# Patient Record
Sex: FEMALE | Race: WHITE | NOT HISPANIC OR LATINO | URBAN - METROPOLITAN AREA
[De-identification: names, ages, dates, MRNs, and addresses within clinical notes are randomized per-mention and may not be internally consistent; named-entity substitution may affect disease eponyms.]

---

## 2017-07-28 ENCOUNTER — OUTPATIENT (OUTPATIENT)
Dept: OUTPATIENT SERVICES | Facility: HOSPITAL | Age: 37
LOS: 1 days | End: 2017-07-28
Payer: COMMERCIAL

## 2017-07-28 DIAGNOSIS — Z01.818 ENCOUNTER FOR OTHER PREPROCEDURAL EXAMINATION: ICD-10-CM

## 2017-07-28 LAB
BLD GP AB SCN SERPL QL: NEGATIVE — SIGNIFICANT CHANGE UP
BLD GP AB SCN SERPL QL: NEGATIVE — SIGNIFICANT CHANGE UP
HCT VFR BLD CALC: 36 % — SIGNIFICANT CHANGE UP (ref 34.5–45)
HGB BLD-MCNC: 11.7 G/DL — SIGNIFICANT CHANGE UP (ref 11.5–15.5)
MCHC RBC-ENTMCNC: 31 PG — SIGNIFICANT CHANGE UP (ref 27–34)
MCHC RBC-ENTMCNC: 32.5 G/DL — SIGNIFICANT CHANGE UP (ref 32–36)
MCV RBC AUTO: 95.5 FL — SIGNIFICANT CHANGE UP (ref 80–100)
PLATELET # BLD AUTO: 217 K/UL — SIGNIFICANT CHANGE UP (ref 150–400)
RBC # BLD: 3.77 M/UL — LOW (ref 3.8–5.2)
RBC # FLD: 13 % — SIGNIFICANT CHANGE UP (ref 10.3–16.9)
RH IG SCN BLD-IMP: POSITIVE — SIGNIFICANT CHANGE UP
RH IG SCN BLD-IMP: POSITIVE — SIGNIFICANT CHANGE UP
WBC # BLD: 7.9 K/UL — SIGNIFICANT CHANGE UP (ref 3.8–10.5)
WBC # FLD AUTO: 7.9 K/UL — SIGNIFICANT CHANGE UP (ref 3.8–10.5)

## 2017-07-28 PROCEDURE — 86780 TREPONEMA PALLIDUM: CPT

## 2017-07-28 PROCEDURE — 86900 BLOOD TYPING SEROLOGIC ABO: CPT

## 2017-07-28 PROCEDURE — 86850 RBC ANTIBODY SCREEN: CPT

## 2017-07-28 PROCEDURE — 86901 BLOOD TYPING SEROLOGIC RH(D): CPT

## 2017-07-28 PROCEDURE — 85027 COMPLETE CBC AUTOMATED: CPT

## 2017-07-30 LAB — T PALLIDUM AB TITR SER: NEGATIVE — SIGNIFICANT CHANGE UP

## 2017-07-31 ENCOUNTER — INPATIENT (INPATIENT)
Facility: HOSPITAL | Age: 37
LOS: 1 days | Discharge: HOME | End: 2017-08-02
Attending: OBSTETRICS & GYNECOLOGY | Admitting: OBSTETRICS & GYNECOLOGY
Payer: COMMERCIAL

## 2017-07-31 VITALS — WEIGHT: 155.43 LBS | HEIGHT: 67 IN

## 2017-07-31 LAB
HBV SURFACE AG SER-ACNC: SIGNIFICANT CHANGE UP
HIV 1+2 AB+HIV1 P24 AG SERPL QL IA: SIGNIFICANT CHANGE UP

## 2017-07-31 RX ORDER — CEFAZOLIN SODIUM 1 G
2000 VIAL (EA) INJECTION ONCE
Qty: 0 | Refills: 0 | Status: COMPLETED | OUTPATIENT
Start: 2017-07-31 | End: 2017-07-31

## 2017-07-31 RX ORDER — SODIUM CHLORIDE 9 MG/ML
1000 INJECTION, SOLUTION INTRAVENOUS
Qty: 0 | Refills: 0 | Status: DISCONTINUED | OUTPATIENT
Start: 2017-07-31 | End: 2017-07-31

## 2017-07-31 RX ORDER — OXYTOCIN 10 UNIT/ML
333.33 VIAL (ML) INJECTION
Qty: 20 | Refills: 0 | Status: COMPLETED | OUTPATIENT
Start: 2017-07-31 | End: 2017-07-31

## 2017-07-31 RX ORDER — SODIUM CHLORIDE 9 MG/ML
1000 INJECTION, SOLUTION INTRAVENOUS
Qty: 0 | Refills: 0 | Status: DISCONTINUED | OUTPATIENT
Start: 2017-08-01 | End: 2017-08-02

## 2017-07-31 RX ORDER — SODIUM CHLORIDE 9 MG/ML
1000 INJECTION, SOLUTION INTRAVENOUS ONCE
Qty: 0 | Refills: 0 | Status: COMPLETED | OUTPATIENT
Start: 2017-07-31 | End: 2017-07-31

## 2017-07-31 RX ORDER — SIMETHICONE 80 MG/1
80 TABLET, CHEWABLE ORAL EVERY 4 HOURS
Qty: 0 | Refills: 0 | Status: DISCONTINUED | OUTPATIENT
Start: 2017-07-31 | End: 2017-08-02

## 2017-07-31 RX ORDER — DOCUSATE SODIUM 100 MG
100 CAPSULE ORAL
Qty: 0 | Refills: 0 | Status: DISCONTINUED | OUTPATIENT
Start: 2017-07-31 | End: 2017-08-02

## 2017-07-31 RX ORDER — ONDANSETRON 8 MG/1
4 TABLET, FILM COATED ORAL EVERY 6 HOURS
Qty: 0 | Refills: 0 | Status: DISCONTINUED | OUTPATIENT
Start: 2017-07-31 | End: 2017-08-02

## 2017-07-31 RX ORDER — ACETAMINOPHEN 500 MG
650 TABLET ORAL EVERY 6 HOURS
Qty: 0 | Refills: 0 | Status: DISCONTINUED | OUTPATIENT
Start: 2017-07-31 | End: 2017-08-02

## 2017-07-31 RX ORDER — LANOLIN
1 OINTMENT (GRAM) TOPICAL
Qty: 0 | Refills: 0 | Status: DISCONTINUED | OUTPATIENT
Start: 2017-07-31 | End: 2017-08-02

## 2017-07-31 RX ORDER — NALOXONE HYDROCHLORIDE 4 MG/.1ML
0.1 SPRAY NASAL
Qty: 0 | Refills: 0 | Status: DISCONTINUED | OUTPATIENT
Start: 2017-07-31 | End: 2017-08-02

## 2017-07-31 RX ORDER — IBUPROFEN 200 MG
600 TABLET ORAL EVERY 6 HOURS
Qty: 0 | Refills: 0 | Status: DISCONTINUED | OUTPATIENT
Start: 2017-07-31 | End: 2017-08-02

## 2017-07-31 RX ORDER — OXYCODONE AND ACETAMINOPHEN 5; 325 MG/1; MG/1
1 TABLET ORAL
Qty: 0 | Refills: 0 | Status: DISCONTINUED | OUTPATIENT
Start: 2017-07-31 | End: 2017-08-02

## 2017-07-31 RX ORDER — METOCLOPRAMIDE HCL 10 MG
10 TABLET ORAL ONCE
Qty: 0 | Refills: 0 | Status: DISCONTINUED | OUTPATIENT
Start: 2017-07-31 | End: 2017-07-31

## 2017-07-31 RX ORDER — OXYCODONE AND ACETAMINOPHEN 5; 325 MG/1; MG/1
2 TABLET ORAL EVERY 6 HOURS
Qty: 0 | Refills: 0 | Status: DISCONTINUED | OUTPATIENT
Start: 2017-07-31 | End: 2017-08-02

## 2017-07-31 RX ORDER — TETANUS TOXOID, REDUCED DIPHTHERIA TOXOID AND ACELLULAR PERTUSSIS VACCINE, ADSORBED 5; 2.5; 8; 8; 2.5 [IU]/.5ML; [IU]/.5ML; UG/.5ML; UG/.5ML; UG/.5ML
0.5 SUSPENSION INTRAMUSCULAR ONCE
Qty: 0 | Refills: 0 | Status: COMPLETED | OUTPATIENT
Start: 2017-07-31

## 2017-07-31 RX ORDER — CITRIC ACID/SODIUM CITRATE 300-500 MG
30 SOLUTION, ORAL ORAL ONCE
Qty: 0 | Refills: 0 | Status: COMPLETED | OUTPATIENT
Start: 2017-07-31 | End: 2017-07-31

## 2017-07-31 RX ORDER — METOCLOPRAMIDE HCL 10 MG
10 TABLET ORAL ONCE
Qty: 0 | Refills: 0 | Status: COMPLETED | OUTPATIENT
Start: 2017-07-31 | End: 2017-07-31

## 2017-07-31 RX ORDER — DIPHENHYDRAMINE HCL 50 MG
25 CAPSULE ORAL EVERY 6 HOURS
Qty: 0 | Refills: 0 | Status: DISCONTINUED | OUTPATIENT
Start: 2017-07-31 | End: 2017-08-02

## 2017-07-31 RX ORDER — FERROUS SULFATE 325(65) MG
325 TABLET ORAL DAILY
Qty: 0 | Refills: 0 | Status: DISCONTINUED | OUTPATIENT
Start: 2017-07-31 | End: 2017-08-02

## 2017-07-31 RX ORDER — GLYCERIN ADULT
1 SUPPOSITORY, RECTAL RECTAL AT BEDTIME
Qty: 0 | Refills: 0 | Status: DISCONTINUED | OUTPATIENT
Start: 2017-07-31 | End: 2017-08-02

## 2017-07-31 RX ORDER — OXYTOCIN 10 UNIT/ML
41.67 VIAL (ML) INJECTION
Qty: 20 | Refills: 0 | Status: DISCONTINUED | OUTPATIENT
Start: 2017-07-31 | End: 2017-08-02

## 2017-07-31 RX ADMIN — Medication 1000 MILLIUNIT(S)/MIN: at 10:23

## 2017-07-31 RX ADMIN — ONDANSETRON 4 MILLIGRAM(S): 8 TABLET, FILM COATED ORAL at 12:15

## 2017-07-31 RX ADMIN — Medication 100 MILLIGRAM(S): at 08:25

## 2017-07-31 RX ADMIN — Medication 125 MILLIUNIT(S)/MIN: at 10:23

## 2017-07-31 RX ADMIN — Medication 600 MILLIGRAM(S): at 23:00

## 2017-07-31 RX ADMIN — Medication 600 MILLIGRAM(S): at 22:22

## 2017-07-31 RX ADMIN — Medication 30 MILLILITER(S): at 08:24

## 2017-07-31 RX ADMIN — SODIUM CHLORIDE 125 MILLILITER(S): 9 INJECTION, SOLUTION INTRAVENOUS at 08:24

## 2017-07-31 RX ADMIN — Medication 10 MILLIGRAM(S): at 16:31

## 2017-07-31 RX ADMIN — Medication 600 MILLIGRAM(S): at 16:47

## 2017-07-31 RX ADMIN — Medication 600 MILLIGRAM(S): at 17:30

## 2017-07-31 RX ADMIN — SODIUM CHLORIDE 2000 MILLILITER(S): 9 INJECTION, SOLUTION INTRAVENOUS at 07:00

## 2017-07-31 NOTE — DISCHARGE NOTE OB - CARE PROVIDER_API CALL
Chip Reina), Obstetrics and Gynecology  [address] [place] [address]  Phone: [phone]  Fax: [phone]

## 2017-07-31 NOTE — PROGRESS NOTE ADULT - SUBJECTIVE AND OBJECTIVE BOX
Section Operative Note      Pre-Op Diagnosis:REPEEAT  CESAEREAN SECTION  AT  TERM      Post-Op Diagnosis:SAME       Time Out: 	[x ] Performed		[ ]Not Performed:  Procedure: 	 Section: 	[ X] Repeat 	#__2_____	[ ] Primary         [ ]Emergency	        [ ]Other____________:  Uterine incision:         [ x]Low Transverse C/S	[ ]Low Vertical C/S           [ ]Classical C/S							  Additional Procedures: 	[ ] Bilateral Tubal Ligation.  Type:______________  Other:	[ ]Lysis of Adhesions   	[ ]C-Hysterectomy          [ ]Other__________________:  Surgeon:  Dr. Reina                                                   .	Assist:   __DR JENNIFER PRIETO , DR NOLAND _________________.                                                                             .   Anesthesia: _______DR JONES_______________________	Type: [ ]Epidural  [X ] Spinal   [ ] General	[ ]Other:  IV Fluids:[id number]  2G  ANCEF 20  U PITOCIN 		Urine Output:	500CC CLEAR	Wilkerson:  [ ] Yes [ ]No [ ] Other:  EBL:    900		  Delivery findings:    [X ] Live[ ]Non-Viable: [ ]MALE   [X ]FEMALE, Infant. APGAR       8       AND     9            .  [X ] Peds at delivery.  [ ]MULTIPLE GESTATION -NOTES:      POSITION:                        PRESENTATION                                .  DELIVERED BY:  	[X ]HEAD 		[ ]BREECH 	[ ]OTHER:  		[X ]MANUAL 		[ ]VACUUM	[ ] OTHER:	  PLACENTA: 	[x ]Removed	[ x]Uterus explored		[x ]Empty		[ ]Other 		  		UTERUS:  	[ x]Normal		[ ]Fibroids		[ ] Other:  ADNEXA: 	[ x]Right Normal	[ ]Other:  	[x ]Left Normal	[ ]Other :  PELVIS:	[ x]Normal		[ ]Adhesions [ ]Mild  [ ]Moderate [ ]Severe  Procedure:  	Patient in supine position.    Skin Incision	[x ]Pfannenstiel	[ ]Other:			[ ]Old scar  removal.  		Fascial Incision	[ x]Transverse 	[ ]Other:		  Peritoneal incision	[x ]Performed	[ x]Vertical  [ ]Other:		[ ]Lysis of Adhesions  		Bladder Flap	[ ]Created	[ ]Not Created  		Uterine Incision	[x ]Low transverse	[ ]Low Vertical	[ ]Classical  [ ]Other:   	Uterine Repair	[ x]_#_1_______Layers-Using__1 chromic______________ sutures 	[x ] hemostasis  excellent.  				[ ]Other:                       .                      Abdominal Cavity	[x ]Cleared of clots and debris  Rectus  Muscles  	Reapproximated [x ]Yes Using__1 chromic______________ sutures. [ ]Not Re- Approximated.  Fascial Reapproximation 	[ x]UsingUsing___1 Vicril_____________ sutures [ ]Other:                                 .	  Subcutaneous Tissue 	[x ]Irrigated  and hemostasis assured:[x ]Reapproximated Using____2-0____________ sutures.  Skin Reapproximation:	[x ]Subcuticular Using________________ sutures [x ] Insorb Staples   [ ]Skin Staples [ ]Other:  Dressing applied  and Patient to RR in  [x ] Stable [ ] Other ;                         condition.	  End of Operation;	[x ] Sponge/lap/needle count correct.  [ ] Not correct.  [ ]Not Done [ ]X-ray=Clear  Pathology:	[ x]Placenta.   	[ ]Fallopian Tube Portions [ ]Right [ ]Left.	[ ]Other:                                           	Complications/Attending’s Notes:	[ ] None.  	[ ] Other:                                                                        [ ]CONTINUATION OF NOTES NEXT PAGE:  Section Operative Note      Pre-Op Diagnosis:REPEEAT  CESAEREAN SECTION  AT  TERM      Post-Op Diagnosis:SAME       Time Out: 	[x ] Performed		[ ]Not Performed:  Procedure: 	 Section: 	[ X] Repeat 	#__2_____	[ ] Primary         [ ]Emergency	        [ ]Other____________:  Uterine incision:         [ x]Low Transverse C/S	[ ]Low Vertical C/S           [ ]Classical C/S							  Additional Procedures: 	[ ] Bilateral Tubal Ligation.  Type:______________  Other:	[ ]Lysis of Adhesions   	[ ]C-Hysterectomy          [ ]Other__________________:  Surgeon:  Dr. Reina                                                   .	Assist:   __DR JENNIFER PRIETO , DR NOLAND _________________.                                                                             .   Anesthesia: _______DR JONES_______________________	Type: [ ]Epidural  [X ] Spinal   [ ] General	[ ]Other:  IV Fluids:[id number]  2G  ANCEF 20  U PITOCIN 		Urine Output:	500CC CLEAR	Wilkerson:  [ ] Yes [ ]No [ ] Other:  EBL:    900		  Delivery findings:    [X ] Live[ ]Non-Viable: [ ]MALE   [X ]FEMALE, Infant. APGAR       8       AND     9            .  [X ] Peds at delivery.  [ ]MULTIPLE GESTATION -NOTES:      POSITION:                        PRESENTATION                                .  DELIVERED BY:  	[X ]HEAD 		[ ]BREECH 	[ ]OTHER:  		[X ]MANUAL 		[ ]VACUUM	[ ] OTHER:	  PLACENTA: 	[x ]Removed	[ x]Uterus explored		[x ]Empty		[ x]Other  Nuchal  cord  x1 		  		UTERUS:  	[ x]Normal		[ ]Fibroids		[ ] Other:  ADNEXA: 	[ x]Right Normal	[ ]Other:  	[x ]Left Normal	[ ]Other :  PELVIS:	[ x]Normal		[ ]Adhesions [ ]Mild  [ ]Moderate [ ]Severe  Procedure:  	Patient in supine position.    Skin Incision	[x ]Pfannenstiel	[ ]Other:			[ ]Old scar  removal.  		Fascial Incision	[ x]Transverse 	[ ]Other:		  Peritoneal incision	[x ]Performed	[ x]Vertical  [ ]Other:		[ ]Lysis of Adhesions  		Bladder Flap	[ ]Created	[ ]Not Created  		Uterine Incision	[x ]Low transverse	[ ]Low Vertical	[ ]Classical  [ ]Other:   	Uterine Repair	[ x]_#_1_______Layers-Using__1 chromic______________ sutures 	[x ] hemostasis  excellent.  				[ ]Other:                       .                      Abdominal Cavity	[x ]Cleared of clots and debris  Rectus  Muscles  	Reapproximated [x ]Yes Using__1 chromic______________ sutures. [ ]Not Re- Approximated.  Fascial Reapproximation 	[ x]UsingUsing___1 Vicril_____________ sutures [ ]Other:                                 .	  Subcutaneous Tissue 	[x ]Irrigated  and hemostasis assured:[x ]Reapproximated Using____2-0____________ sutures.  Skin Reapproximation:	[x ]Subcuticular Using________________ sutures [x ] Insorb Staples   [ ]Skin Staples [ ]Other:  Dressing applied  and Patient to RR in  [x ] Stable [ ] Other ;                         condition.	  End of Operation;	[x ] Sponge/lap/needle count correct.  [ ] Not correct.  [ ]Not Done [ ]X-ray=Clear  Pathology:	[ x]Placenta.   	[ ]Fallopian Tube Portions [ ]Right [ ]Left.	[ ]Other:                                           	Complications/Attending’s Notes:	[ ] None.  	[ ] Other:                                                                        [ ]CONTINUATION OF NOTES NEXT PAGE:

## 2017-07-31 NOTE — DISCHARGE NOTE OB - PATIENT PORTAL LINK FT
“You can access the [employer] Patient Portal, offered by [facility], by registering with the following website: [email or web address]”

## 2017-07-31 NOTE — DISCHARGE NOTE OB - PLAN OF CARE
HOME Nothin per vagina  no heavy  lifting,  no  sex  no  tub  bath  no swimming Nothing per vagina  no heavy  lifting,  no  sex  no  tub  bath  no swimming

## 2017-07-31 NOTE — DISCHARGE NOTE OB - MATERIALS PROVIDED
[facility] [place] Screening Program/Breastfeeding Guide and Packet/Back To Sleep Handout/Shaken Baby Prevention Handout/[facility] [place] Screen Program/[place]  Immunization Record/Guide to Postpartum Care

## 2017-07-31 NOTE — DISCHARGE NOTE OB - CARE PLAN
Principal Discharge DX:	Normal postpartum course  Goal:	HOME  Instructions for follow-up, activity and diet:	Nothin per vagina  no heavy  lifting,  no  sex  no  tub  bath  no swimming Principal Discharge DX:	Normal postpartum course  Goal:	HOME  Instructions for follow-up, activity and diet:	Nothing per vagina  no heavy  lifting,  no  sex  no  tub  bath  no swimming

## 2017-07-31 NOTE — DISCHARGE NOTE OB - HOSPITAL COURSE
Repeat  low  transeverse    sectin  livve  female  infant apgar  8  an9    one  nuchal  cord .  ebl m 900cc  ped  at  [facility]

## 2017-08-01 RX ORDER — ACETAMINOPHEN 500 MG
650 TABLET ORAL EVERY 6 HOURS
Qty: 0 | Refills: 0 | Status: DISCONTINUED | OUTPATIENT
Start: 2017-08-01 | End: 2017-08-02

## 2017-08-01 RX ORDER — ZINC OXIDE 200 MG/G
1 OINTMENT TOPICAL DAILY
Qty: 0 | Refills: 0 | Status: DISCONTINUED | OUTPATIENT
Start: 2017-08-01 | End: 2017-08-02

## 2017-08-01 RX ORDER — TETANUS TOXOID, REDUCED DIPHTHERIA TOXOID AND ACELLULAR PERTUSSIS VACCINE, ADSORBED 5; 2.5; 8; 8; 2.5 [IU]/.5ML; [IU]/.5ML; UG/.5ML; UG/.5ML; UG/.5ML
0.5 SUSPENSION INTRAMUSCULAR ONCE
Qty: 0 | Refills: 0 | Status: COMPLETED | OUTPATIENT
Start: 2017-08-01 | End: 2017-08-01

## 2017-08-01 RX ADMIN — Medication 650 MILLIGRAM(S): at 09:13

## 2017-08-01 RX ADMIN — Medication 600 MILLIGRAM(S): at 18:30

## 2017-08-01 RX ADMIN — Medication 600 MILLIGRAM(S): at 11:30

## 2017-08-01 RX ADMIN — Medication 1 TABLET(S): at 15:59

## 2017-08-01 RX ADMIN — Medication 100 MILLIGRAM(S): at 15:59

## 2017-08-01 RX ADMIN — Medication 650 MILLIGRAM(S): at 15:59

## 2017-08-01 RX ADMIN — Medication 600 MILLIGRAM(S): at 04:15

## 2017-08-01 RX ADMIN — Medication 600 MILLIGRAM(S): at 22:20

## 2017-08-01 RX ADMIN — Medication 325 MILLIGRAM(S): at 15:59

## 2017-08-01 RX ADMIN — Medication 600 MILLIGRAM(S): at 04:45

## 2017-08-01 RX ADMIN — Medication 600 MILLIGRAM(S): at 23:22

## 2017-08-01 RX ADMIN — Medication 600 MILLIGRAM(S): at 17:51

## 2017-08-01 RX ADMIN — Medication 1 APPLICATION(S): at 15:59

## 2017-08-01 RX ADMIN — TETANUS TOXOID, REDUCED DIPHTHERIA TOXOID AND ACELLULAR PERTUSSIS VACCINE, ADSORBED 0.5 MILLILITER(S): 5; 2.5; 8; 8; 2.5 SUSPENSION INTRAMUSCULAR at 17:53

## 2017-08-01 RX ADMIN — Medication 600 MILLIGRAM(S): at 10:48

## 2017-08-01 RX ADMIN — Medication 650 MILLIGRAM(S): at 16:35

## 2017-08-01 RX ADMIN — ZINC OXIDE 1 APPLICATION(S): 200 OINTMENT TOPICAL at 15:59

## 2017-08-01 NOTE — LACTATION INITIAL EVALUATION - INTERVENTION OUTCOME
demonstrates understanding of teaching/good return demonstration/needs met/discharge criteria met/verbalizes understanding

## 2017-08-01 NOTE — PROGRESS NOTE ADULT - ASSESSMENT
A/P   37y  s/p c/s, POD #1, stable  -  Pain: PO motrin, tylenol, removed tape/bandage today, had minor tape burn, ordered zinc oxide 20% ointment for site, continue to monitor   -  GI: tolerating regular diet, has yet to pass gas, continue to monitor  -  : s/p turnre, excellent urine output  -  DVT prophylaxis: ambulation, SCDs,   -  Dispo: POD 3 or 4

## 2017-08-01 NOTE — PROGRESS NOTE ADULT - SUBJECTIVE AND OBJECTIVE BOX
Patient evaluated at bedside. She has been ambulating without assistance, voiding spontaneouslytolerating regular diet and is breastfeeding.  She reports pain is well controlled with Motrin and Tylenol.  She denies headache, dizziness, chest pain, palpitations, shortness of breathe, nausea, vomiting or heavy vaginal bleeding. Has noticed a decrease in vaginal bleeding since yesterday. Has not passed yet gas today. Reports lower abdominal pressure and mild pain with movement.       Physical Exam:  Vital Signs Last 24 Hrs  T(C): 37.1 (01 Aug 2017 10:01), Max: 37.1 (31 Jul 2017 18:10)  T(F): 98.7 (01 Aug 2017 10:01), Max: 98.7 (31 Jul 2017 18:10)  HR: 77 (01 Aug 2017 10:01) (52 - 77)  BP: 93/57 (01 Aug 2017 10:01) (92/51 - 98/57)  BP(mean): --  RR: 18 (01 Aug 2017 10:01) (16 - 18)  SpO2: 95% (01 Aug 2017 10:01) (95% - 98%)    Constitutional: Alert & Oriented x3, No acute distress, cooperative   Gastrointestinal: soft, mildy tender, positive bowel sounds, no rebound or guarding; uterus firm at midline,  2 fb below umbilicus  Incision: steristrips in place; area clean, dry and intact; no erythema or induration   : s/p turner, lochia WNL   Extremities: SCDs in place, no calf tenderness or swelling

## 2017-08-01 NOTE — LACTATION INITIAL EVALUATION - INFANT FEEDING PLAN COMMENT, OB PROFILE
Offer as much S2S time as possible with the baby. FOD of baby making sure baby feeds 8-12 times per 24 hours. Monitor output and keep a log. F/U with Ped s/p D/C as instructed.

## 2017-08-01 NOTE — PROGRESS NOTE ADULT - SUBJECTIVE AND OBJECTIVE BOX
OB/GYN ATTENDING POSTOP ROUNDS                                    Subjective:  37yFemale  I WANT  TO GO  HOME  TOMORROW   Complaints: [x ]None	[ ]Other:      Objective:  		Vital Signs:  T(C): 36.8 (17 @ 14:00), Max: 37.1 (17 @ 10:01)  HR: 66 (17 @ 14:00) (60 - 77)  BP: 93/57 (17 @ 14:00) (93/57 - 97/62)  RR: 18 (17 @ 14:00) (18 - 18)  SpO2: 98% (17 @ 14:00) (95% - 98%)  Wt(kg): --     @ :  -   @ 07:00  --------------------------------------------------------  IN: 1850 mL / OUT: 2650 mL / NET: -800 mL     @ 07:01  -   @ 23:27  --------------------------------------------------------  IN: 0 mL / OUT: 700 mL / NET: -700 mL        		General:      NAD 	[x ] Yes 	[ ]No,	 A&O X3	[ x] Yes  [ ] No			  		Abdomen:   Palpation  	[x ]Normal	[ ]Other:	  	   Incision:              [x  ]Intact	   [ x] Clean	[x ] Dry    [   ]other:  BS		[ x]Normal 	[ ]Other:  			  LE:  	Calf tenderness    	[ x]None		[x ]No  Sign of DVT	[ ]Other:  		Lochia:	 		[ x]Normal,	[ ]Other:  		Labs:		       Assessment:  			[x ] Post-op  Section  			Day #____1____  				[ ] Other:     	Plan:	1.  Supportive Care		[ ]Other:                                           2. Pain:		[ x] Well Controlled 	[ ] Other:	           	     	3. Misc.		[ x]Continue current care                                            4. Discharge home on HOME 17    PER  PT REQUEST  if stable	[ ] Other:  		  Notes:			[x ] None			[ ] Other:      													Chip Reina MD [phone]

## 2017-08-01 NOTE — LACTATION INITIAL EVALUATION - NS LACT CON REASON FOR REQ
This is mom's second baby and she reports bf'ing her first child successfully for 6 months and then pumped and supplemented until 9 months of age. Found this baby latched onto her R breast in cross cradle position in a deep effective latch with rhythmic sucks and appropriate pauses. Mom c/o slightly sore nipples, but denies skin breakdown. She reports her L nipple "sometimes comes out of the baby's mouth looking creased". Discussed with mom that can be indicative of a slightly shallow latch. Encouraged mom to breastfeed baby S2S to help facilitate a deeper latch. Baby is nursing now swaddled in a blanket. Also encouraged mom to wait to latch baby until baby gives a deep wide mouth opening like a yawn, so the baby takes in more maternal breast tissue from the first latch. Baby born 7/[birth year]@ 0901, R C/S. x6 voids/ x6 poops since birth (breast only, WNL). Wt loss=3.18%. Encouraged lots of S2S and FOD of baby with at least 8-12 feeds per 24 hours. Mom reports she feels her breasts feel heavier today and are filling. Parents report seeing brown/green stool (transitional) at baby's last diaper change. Questions answered, parents reassured./multiparous mom/c/o sore, painful nipples

## 2017-08-02 VITALS
DIASTOLIC BLOOD PRESSURE: 60 MMHG | RESPIRATION RATE: 16 BRPM | TEMPERATURE: 98 F | HEART RATE: 66 BPM | SYSTOLIC BLOOD PRESSURE: 98 MMHG | OXYGEN SATURATION: 97 %

## 2017-08-02 LAB
HCT VFR BLD CALC: 31.3 % — LOW (ref 34.5–45)
HGB BLD-MCNC: 10.3 G/DL — LOW (ref 11.5–15.5)
MCHC RBC-ENTMCNC: 31.7 PG — SIGNIFICANT CHANGE UP (ref 27–34)
MCHC RBC-ENTMCNC: 32.9 G/DL — SIGNIFICANT CHANGE UP (ref 32–36)
MCV RBC AUTO: 96.3 FL — SIGNIFICANT CHANGE UP (ref 80–100)
PLATELET # BLD AUTO: 280 K/UL — SIGNIFICANT CHANGE UP (ref 150–400)
RBC # BLD: 3.25 M/UL — LOW (ref 3.8–5.2)
RBC # FLD: 13.2 % — SIGNIFICANT CHANGE UP (ref 10.3–16.9)
T PALLIDUM AB TITR SER: NEGATIVE — SIGNIFICANT CHANGE UP
WBC # BLD: 7.2 K/UL — SIGNIFICANT CHANGE UP (ref 3.8–10.5)
WBC # FLD AUTO: 7.2 K/UL — SIGNIFICANT CHANGE UP (ref 3.8–10.5)

## 2017-08-02 PROCEDURE — 88307 TISSUE EXAM BY PATHOLOGIST: CPT

## 2017-08-02 PROCEDURE — 87389 HIV-1 AG W/HIV-1&-2 AB AG IA: CPT

## 2017-08-02 PROCEDURE — 86780 TREPONEMA PALLIDUM: CPT

## 2017-08-02 PROCEDURE — 86762 RUBELLA ANTIBODY: CPT

## 2017-08-02 PROCEDURE — 85027 COMPLETE CBC AUTOMATED: CPT

## 2017-08-02 PROCEDURE — 90715 TDAP VACCINE 7 YRS/> IM: CPT

## 2017-08-02 PROCEDURE — 87340 HEPATITIS B SURFACE AG IA: CPT

## 2017-08-02 RX ADMIN — Medication 600 MILLIGRAM(S): at 03:49

## 2017-08-02 RX ADMIN — Medication 600 MILLIGRAM(S): at 04:19

## 2017-08-02 RX ADMIN — Medication 600 MILLIGRAM(S): at 09:51

## 2017-08-02 RX ADMIN — Medication 325 MILLIGRAM(S): at 12:34

## 2017-08-02 RX ADMIN — Medication 1 TABLET(S): at 12:34

## 2017-08-02 RX ADMIN — Medication 600 MILLIGRAM(S): at 10:30

## 2017-08-02 RX ADMIN — Medication 600 MILLIGRAM(S): at 15:58

## 2017-08-02 RX ADMIN — Medication 100 MILLIGRAM(S): at 12:34

## 2017-08-02 NOTE — PROGRESS NOTE ADULT - SUBJECTIVE AND OBJECTIVE BOX
Patient evaluated at bedside.   She reports pain is well controlled.  She denies heavy vaginal bleeding.  She has been ambulating without assistance, voiding spontaneously, passing gas, tolerating regular diet and is breastfeeding.    Physical Exam:  Vital Signs Last 24 Hrs  T(C): 36.6 (02 Aug 2017 05:58), Max: 36.8 (01 Aug 2017 14:00)  T(F): 97.9 (02 Aug 2017 05:58), Max: 98.2 (01 Aug 2017 14:00)  HR: 63 (02 Aug 2017 05:58) (63 - 69)  BP: 106/64 (02 Aug 2017 05:58) (93/57 - 106/64)  BP(mean): --  RR: 17 (02 Aug 2017 05:58) (16 - 18)  SpO2: 97% (02 Aug 2017 05:58) (97% - 98%)    GA: NAD, A+0 x 3  Abd: soft, nontender, nondistended, no rebound or guarding, incision clean, dry and intact, uterus firm at midline,  below the umbilicus  : lochia WNL  Extremities: no calf tenderness

## 2017-08-02 NOTE — PROGRESS NOTE ADULT - ASSESSMENT
A/P  38yo  s/p c/s, POD #2  ,stable  1. Pain: well controlled on PO pain meds  2. GI: regular  3. : voiding  4. DVT prophylaxis: sqh, ambulation  5. Dispo: POD 2

## 2017-08-03 LAB
RUBV IGG SER-ACNC: 1 INDEX — SIGNIFICANT CHANGE UP
RUBV IGG SER-IMP: SIGNIFICANT CHANGE UP

## 2017-08-04 DIAGNOSIS — Z34.83 ENCOUNTER FOR SUPERVISION OF OTHER NORMAL PREGNANCY, THIRD TRIMESTER: ICD-10-CM

## 2017-08-04 DIAGNOSIS — O34.211 MATERNAL CARE FOR LOW TRANSVERSE SCAR FROM PREVIOUS CESAREAN DELIVERY: ICD-10-CM

## 2017-08-04 DIAGNOSIS — F41.9 ANXIETY DISORDER, UNSPECIFIED: ICD-10-CM

## 2017-08-04 DIAGNOSIS — Z3A.39 39 WEEKS GESTATION OF PREGNANCY: ICD-10-CM

## 2017-08-08 LAB — MEV IGM SER-ACNC: <20 AU/ML — SIGNIFICANT CHANGE UP (ref 0–19.9)

## 2017-08-10 LAB — SURGICAL PATHOLOGY STUDY: SIGNIFICANT CHANGE UP
